# Patient Record
Sex: MALE | Race: WHITE | NOT HISPANIC OR LATINO | URBAN - METROPOLITAN AREA
[De-identification: names, ages, dates, MRNs, and addresses within clinical notes are randomized per-mention and may not be internally consistent; named-entity substitution may affect disease eponyms.]

---

## 2022-05-26 ENCOUNTER — APPOINTMENT (RX ONLY)
Dept: URBAN - METROPOLITAN AREA CLINIC 28 | Facility: CLINIC | Age: 27
Setting detail: DERMATOLOGY
End: 2022-05-26

## 2022-05-26 DIAGNOSIS — L70.0 ACNE VULGARIS: ICD-10-CM | Status: INADEQUATELY CONTROLLED

## 2022-05-26 PROCEDURE — ? PRESCRIPTION

## 2022-05-26 PROCEDURE — 99204 OFFICE O/P NEW MOD 45 MIN: CPT

## 2022-05-26 PROCEDURE — ? COUNSELING

## 2022-05-26 PROCEDURE — ? ADDITIONAL NOTES

## 2022-05-26 PROCEDURE — ? TREATMENT REGIMEN

## 2022-05-26 RX ORDER — ADAPALENE AND BENZOYL PEROXIDE 3; 25 MG/G; MG/G
GEL TOPICAL
Qty: 60 | Refills: 1 | Status: ERX | COMMUNITY
Start: 2022-05-26

## 2022-05-26 RX ORDER — CLINDAMYCIN PHOSPHATE 10 MG/ML
LOTION TOPICAL
Qty: 60 | Refills: 3 | Status: ERX | COMMUNITY
Start: 2022-05-26

## 2022-05-26 RX ADMIN — ADAPALENE AND BENZOYL PEROXIDE: 3; 25 GEL TOPICAL at 00:00

## 2022-05-26 RX ADMIN — CLINDAMYCIN PHOSPHATE: 10 LOTION TOPICAL at 00:00

## 2022-05-26 ASSESSMENT — LOCATION ZONE DERM: LOCATION ZONE: FACE

## 2022-05-26 ASSESSMENT — LOCATION SIMPLE DESCRIPTION DERM: LOCATION SIMPLE: LEFT CHEEK

## 2022-05-26 ASSESSMENT — LOCATION DETAILED DESCRIPTION DERM: LOCATION DETAILED: LEFT INFERIOR CENTRAL MALAR CHEEK

## 2022-05-26 NOTE — PROCEDURE: COUNSELING
High Dose Vitamin A Pregnancy And Lactation Text: High dose vitamin A therapy is contraindicated during pregnancy and breast feeding.
Spironolactone Counseling: Patient advised regarding risks of diarrhea, abdominal pain, hyperkalemia, birth defects (for female patients), liver toxicity and renal toxicity. The patient may need blood work to monitor liver and kidney function and potassium levels while on therapy. The patient verbalized understanding of the proper use and possible adverse effects of spironolactone.  All of the patient's questions and concerns were addressed.
Winlevi Counseling:  I discussed with the patient the risks of topical clascoterone including but not limited to erythema, scaling, itching, and stinging. Patient voiced their understanding.
Erythromycin Pregnancy And Lactation Text: This medication is Pregnancy Category B and is considered safe during pregnancy. It is also excreted in breast milk.
Benzoyl Peroxide Pregnancy And Lactation Text: This medication is Pregnancy Category C. It is unknown if benzoyl peroxide is excreted in breast milk.
Topical Clindamycin Counseling: Patient counseled that this medication may cause skin irritation or allergic reactions.  In the event of skin irritation, the patient was advised to reduce the amount of the drug applied or use it less frequently.   The patient verbalized understanding of the proper use and possible adverse effects of clindamycin.  All of the patient's questions and concerns were addressed.
Include Pregnancy/Lactation Warning?: No
Aklief Pregnancy And Lactation Text: It is unknown if this medication is safe to use during pregnancy.  It is unknown if this medication is excreted in breast milk.  Breastfeeding women should use the topical cream on the smallest area of the skin for the shortest time needed while breastfeeding.  Do not apply to nipple and areola.
Bactrim Counseling:  I discussed with the patient the risks of sulfa antibiotics including but not limited to GI upset, allergic reaction, drug rash, diarrhea, dizziness, photosensitivity, and yeast infections.  Rarely, more serious reactions can occur including but not limited to aplastic anemia, agranulocytosis, methemoglobinemia, blood dyscrasias, liver or kidney failure, lung infiltrates or desquamative/blistering drug rashes.
Minocycline Counseling: Patient advised regarding possible photosensitivity and discoloration of the teeth, skin, lips, tongue and gums.  Patient instructed to avoid sunlight, if possible.  When exposed to sunlight, patients should wear protective clothing, sunglasses, and sunscreen.  The patient was instructed to call the office immediately if the following severe adverse effects occur:  hearing changes, easy bruising/bleeding, severe headache, or vision changes.  The patient verbalized understanding of the proper use and possible adverse effects of minocycline.  All of the patient's questions and concerns were addressed.
Isotretinoin Counseling: Patient should get monthly blood tests, not donate blood, not drive at night if vision affected, not share medication, and not undergo elective surgery for 6 months after tx completed. Side effects reviewed, pt to contact office should one occur.
Winlevi Pregnancy And Lactation Text: This medication is considered safe during pregnancy and breastfeeding.
Dapsone Pregnancy And Lactation Text: This medication is Pregnancy Category C and is not considered safe during pregnancy or breast feeding.
Topical Retinoid counseling:  Patient advised to apply a pea-sized amount only at bedtime and wait 30 minutes after washing their face before applying.  If too drying, patient may add a non-comedogenic moisturizer. The patient verbalized understanding of the proper use and possible adverse effects of retinoids.  All of the patient's questions and concerns were addressed.
Bactrim Pregnancy And Lactation Text: This medication is Pregnancy Category D and is known to cause fetal risk.  It is also excreted in breast milk.
Spironolactone Pregnancy And Lactation Text: This medication can cause feminization of the male fetus and should be avoided during pregnancy. The active metabolite is also found in breast milk.
Azelaic Acid Counseling: Patient counseled that medicine may cause skin irritation and to avoid applying near the eyes.  In the event of skin irritation, the patient was advised to reduce the amount of the drug applied or use it less frequently.   The patient verbalized understanding of the proper use and possible adverse effects of azelaic acid.  All of the patient's questions and concerns were addressed.
Minocycline Pregnancy And Lactation Text: This medication is Pregnancy Category D and not consider safe during pregnancy. It is also excreted in breast milk.
Topical Clindamycin Pregnancy And Lactation Text: This medication is Pregnancy Category B and is considered safe during pregnancy. It is unknown if it is excreted in breast milk.
Doxycycline Counseling:  Patient counseled regarding possible photosensitivity and increased risk for sunburn.  Patient instructed to avoid sunlight, if possible.  When exposed to sunlight, patients should wear protective clothing, sunglasses, and sunscreen.  The patient was instructed to call the office immediately if the following severe adverse effects occur:  hearing changes, easy bruising/bleeding, severe headache, or vision changes.  The patient verbalized understanding of the proper use and possible adverse effects of doxycycline.  All of the patient's questions and concerns were addressed.
Birth Control Pills Counseling: Birth Control Pill Counseling: I discussed with the patient the potential side effects of OCPs including but not limited to increased risk of stroke, heart attack, thrombophlebitis, deep venous thrombosis, hepatic adenomas, breast changes, GI upset, headaches, and depression.  The patient verbalized understanding of the proper use and possible adverse effects of OCPs. All of the patient's questions and concerns were addressed.
Topical Retinoid Pregnancy And Lactation Text: This medication is Pregnancy Category C. It is unknown if this medication is excreted in breast milk.
Isotretinoin Pregnancy And Lactation Text: This medication is Pregnancy Category X and is considered extremely dangerous during pregnancy. It is unknown if it is excreted in breast milk.
Azithromycin Counseling:  I discussed with the patient the risks of azithromycin including but not limited to GI upset, allergic reaction, drug rash, diarrhea, and yeast infections.
Tetracycline Counseling: Patient counseled regarding possible photosensitivity and increased risk for sunburn.  Patient instructed to avoid sunlight, if possible.  When exposed to sunlight, patients should wear protective clothing, sunglasses, and sunscreen.  The patient was instructed to call the office immediately if the following severe adverse effects occur:  hearing changes, easy bruising/bleeding, severe headache, or vision changes.  The patient verbalized understanding of the proper use and possible adverse effects of tetracycline.  All of the patient's questions and concerns were addressed. Patient understands to avoid pregnancy while on therapy due to potential birth defects.
Azelaic Acid Pregnancy And Lactation Text: This medication is considered safe during pregnancy and breast feeding.
Sarecycline Counseling: Patient advised regarding possible photosensitivity and discoloration of the teeth, skin, lips, tongue and gums.  Patient instructed to avoid sunlight, if possible.  When exposed to sunlight, patients should wear protective clothing, sunglasses, and sunscreen.  The patient was instructed to call the office immediately if the following severe adverse effects occur:  hearing changes, easy bruising/bleeding, severe headache, or vision changes.  The patient verbalized understanding of the proper use and possible adverse effects of sarecycline.  All of the patient's questions and concerns were addressed.
Doxycycline Pregnancy And Lactation Text: This medication is Pregnancy Category D and not consider safe during pregnancy. It is also excreted in breast milk but is considered safe for shorter treatment courses.
Topical Sulfur Applications Counseling: Topical Sulfur Counseling: Patient counseled that this medication may cause skin irritation or allergic reactions.  In the event of skin irritation, the patient was advised to reduce the amount of the drug applied or use it less frequently.   The patient verbalized understanding of the proper use and possible adverse effects of topical sulfur application.  All of the patient's questions and concerns were addressed.
High Dose Vitamin A Counseling: Side effects reviewed, pt to contact office should one occur.
Tazorac Counseling:  Patient advised that medication is irritating and drying.  Patient may need to apply sparingly and wash off after an hour before eventually leaving it on overnight.  The patient verbalized understanding of the proper use and possible adverse effects of tazorac.  All of the patient's questions and concerns were addressed.
Birth Control Pills Pregnancy And Lactation Text: This medication should be avoided if pregnant and for the first 30 days post-partum.
Azithromycin Pregnancy And Lactation Text: This medication is considered safe during pregnancy and is also secreted in breast milk.
Topical Sulfur Applications Pregnancy And Lactation Text: This medication is Pregnancy Category C and has an unknown safety profile during pregnancy. It is unknown if this topical medication is excreted in breast milk.
Benzoyl Peroxide Counseling: Patient counseled that medicine may cause skin irritation and bleach clothing.  In the event of skin irritation, the patient was advised to reduce the amount of the drug applied or use it less frequently.   The patient verbalized understanding of the proper use and possible adverse effects of benzoyl peroxide.  All of the patient's questions and concerns were addressed.
Detail Level: Zone
Erythromycin Counseling:  I discussed with the patient the risks of erythromycin including but not limited to GI upset, allergic reaction, drug rash, diarrhea, increase in liver enzymes, and yeast infections.
Tazorac Pregnancy And Lactation Text: This medication is not safe during pregnancy. It is unknown if this medication is excreted in breast milk.
Aklief counseling:  Patient advised to apply a pea-sized amount only at bedtime and wait 30 minutes after washing their face before applying.  If too drying, patient may add a non-comedogenic moisturizer.  The most commonly reported side effects including irritation, redness, scaling, dryness, stinging, burning, itching, and increased risk of sunburn.  The patient verbalized understanding of the proper use and possible adverse effects of retinoids.  All of the patient's questions and concerns were addressed.
Dapsone Counseling: I discussed with the patient the risks of dapsone including but not limited to hemolytic anemia, agranulocytosis, rashes, methemoglobinemia, kidney failure, peripheral neuropathy, headaches, GI upset, and liver toxicity.  Patients who start dapsone require monitoring including baseline LFTs and weekly CBCs for the first month, then every month thereafter.  The patient verbalized understanding of the proper use and possible adverse effects of dapsone.  All of the patient's questions and concerns were addressed.

## 2022-07-12 ENCOUNTER — RX ONLY (OUTPATIENT)
Age: 27
Setting detail: RX ONLY
End: 2022-07-12

## 2022-07-12 ENCOUNTER — APPOINTMENT (RX ONLY)
Dept: URBAN - METROPOLITAN AREA CLINIC 28 | Facility: CLINIC | Age: 27
Setting detail: DERMATOLOGY
End: 2022-07-12

## 2022-07-12 DIAGNOSIS — L70.0 ACNE VULGARIS: ICD-10-CM | Status: WELL CONTROLLED

## 2022-07-12 PROCEDURE — ? TREATMENT REGIMEN

## 2022-07-12 PROCEDURE — ? PRESCRIPTION MEDICATION MANAGEMENT

## 2022-07-12 PROCEDURE — 99213 OFFICE O/P EST LOW 20 MIN: CPT

## 2022-07-12 PROCEDURE — ? RECOMMENDATIONS

## 2022-07-12 RX ORDER — ADAPALENE AND BENZOYL PEROXIDE 3; 25 MG/G; MG/G
1 GEL TOPICAL
Qty: 60 | Refills: 6 | Status: ERX

## 2022-07-12 RX ORDER — CLINDAMYCIN PHOSPHATE 10 MG/ML
1 LOTION TOPICAL
Qty: 60 | Refills: 6 | Status: ERX

## 2022-07-12 ASSESSMENT — LOCATION DETAILED DESCRIPTION DERM: LOCATION DETAILED: RIGHT INFERIOR CENTRAL MALAR CHEEK

## 2022-07-12 ASSESSMENT — LOCATION ZONE DERM: LOCATION ZONE: FACE

## 2022-07-12 ASSESSMENT — LOCATION SIMPLE DESCRIPTION DERM: LOCATION SIMPLE: RIGHT CHEEK

## 2022-07-12 NOTE — PROCEDURE: PRESCRIPTION MEDICATION MANAGEMENT
Render In Strict Bullet Format?: No
Detail Level: Zone
Continue Regimen: Epiduo forte 0.3-2.5% gel: apply thin layer to face QHS\\nClindamycin 1% lotion: apply thin layer to face QAM

## 2022-07-12 NOTE — PROCEDURE: RECOMMENDATIONS
Render Risk Assessment In Note?: no
Detail Level: Zone
Recommendation Preamble: The following recommendations were made during the visit:
Recommendations (Free Text): Micro needling for scars

## 2023-10-16 ENCOUNTER — OFFICE VISIT (OUTPATIENT)
Dept: PRIMARY CARE | Facility: CLINIC | Age: 28
End: 2023-10-16
Payer: COMMERCIAL

## 2023-10-16 VITALS
TEMPERATURE: 97.5 F | DIASTOLIC BLOOD PRESSURE: 80 MMHG | RESPIRATION RATE: 15 BRPM | OXYGEN SATURATION: 99 % | HEART RATE: 60 BPM | HEIGHT: 70 IN | BODY MASS INDEX: 25.62 KG/M2 | SYSTOLIC BLOOD PRESSURE: 118 MMHG | WEIGHT: 179 LBS

## 2023-10-16 DIAGNOSIS — Z00.00 ROUTINE GENERAL MEDICAL EXAMINATION AT A HEALTH CARE FACILITY: Primary | ICD-10-CM

## 2023-10-16 DIAGNOSIS — Z13.220 LIPID SCREENING: ICD-10-CM

## 2023-10-16 PROCEDURE — 99385 PREV VISIT NEW AGE 18-39: CPT | Performed by: STUDENT IN AN ORGANIZED HEALTH CARE EDUCATION/TRAINING PROGRAM

## 2023-10-16 PROCEDURE — 3008F BODY MASS INDEX DOCD: CPT | Performed by: STUDENT IN AN ORGANIZED HEALTH CARE EDUCATION/TRAINING PROGRAM

## 2023-10-16 ASSESSMENT — PATIENT HEALTH QUESTIONNAIRE - PHQ9: SUM OF ALL RESPONSES TO PHQ9 QUESTIONS 1 & 2: 0

## 2023-10-16 ASSESSMENT — PAIN SCALES - GENERAL: PAINLEVEL: 0-NO PAIN

## 2023-10-16 NOTE — PATIENT INSTRUCTIONS
After-Visit Instructions and Tips for General Wellbeing      -Let us know over our Patient Portal (Spring[facility]) if you have any further questions or concerns.         At your visit today, we determined the time for you to come back for your next visit.  This was based off of your medical history, any medical problems/symptoms discussed at time of visit, and any testing ordered.         If at any point between now and your next visit you to have any questions or concerns, please feel free to reach out to me via our Patient Portal, Spring[facility].  Springbot is free to use and I typically get back to you same-day, Monday through Friday.  Sometimes we are able to handle small questions or concerns over [employer], however if we cannot safely do so I will recommend that you come in for an appointment or get set up with a telemedicine appointment.      -Don't forget to get your blood work done.         Blood work is typically performed either through [facility], [facility], or [facility] laboratories.  This is dependent on your insurance, and during your visit we have discussed which lab is covered for you.  It will additionally state what lab you can go to on the hard copy of the lab slip that was provided to you after your visit.            Unless stated otherwise, these labs are FASTING.  You should not have anything to eat or drink besides water or black coffee (no cream or sugar) for at least 8 hours prior to getting your blood drawn.  Because of this, I usually recommend getting your blood work done first thing in the morning, as you have been fasting overnight.  Many people have the same idea however and these labs can get quite busy in the morning.         I recommend going online and setting up an appointment with the lab for an early morning just so that specific time spot is guaranteed for you and you are not waiting for an excess amount of time.  Make sure to bring the hard copy of your lab slip with you just in case they  cannot find you in their computer system.      As soon as I get all of your blood work results back, I will message you over [employer] or call you if the lab results are more pressing.      -Don't forget to get your Preventive Care screenings done.         If you were given any prescriptions for preventive care testing (such as mammograms, colonoscopies, Pap smears, or bone density testing), do not forget to get these done as well.  At your visit we discussed how to go about scheduling these, however if you still have questions you can reach out to me via [employer].         Additionally, all patients should be seen by their dentist for a routine dental cleaning at least once every 6 months.  Sometimes, dentists will bring you back sooner if they have to do more intensive cleaning.  Outside of the dentist, you should make sure you are brushing your teeth and flossing regularly.      -Dietary Tips         Every patient has individual dietary needs, however in general I recommend being cognizant of and limiting your intake of the following for food groups:    Salt/Sodium:       Salt can raise your blood pressure, which in turn can lead to increased risk of developing heart disease or strokes.  Please make sure you limit your use of sodium in cooking, and if you are buying a premade food item that has nutrition facts on the box/bag, please check for sodium content.  Many snacks such as chips or pretzels have a large amount of sodium in them.  Please do not exceed the daily recommended value of your sodium intake.    Sugars and Carbs:       Sugars (such as found in cake, candy, cookies, ice cream, etc.) and carbohydrates (such as found in bread, rice, pasta, starchy veggies such as potatoes, etc.) are both broken down your body into glucose.  Glucose is blood sugar, and excessive blood sugar exposure over the course of years to decades can put you at risk for developing diabetes.  Please make sure that you are limiting both  of these food groups in your diet, especially if you have a history of high blood sugar or strong family history of diabetes.    Fats:       Fatty foods such as fried/greasy foods, excess cooking oils, full fat dairy (whole milk, butter, cheese), and red meat all tend to be very fatty foods.  Fatty foods contain cholesterol, which can lead to heart disease in excessive amounts.  Please make sure you are limiting these foods in your diet.         Remember: The biggest thing on your plate should be responsibly cooked (sautéed in limited amounts of oil, air fried, or baked), responsibly seasoned (limited salt use), green vegetables (not potatoes).      -Exercise Tips         People exercise for multiple different reasons as each patient's exercise needs are unique.  In general, I recommend following the American Heart Association guidelines for heart healthy exercise.        The American Heart Association recommends doing 150 minutes (2.5 hours) of aerobic exercise per week.  You can break this up over the week however you see fit.  This exercise should be more strenuous than something you do on a daily basis, as such a light walk or counting your regular job as exercise do not count.  While these may be good from a calorie burning perspective, these are something your heart does on a daily basis and as such does not count as heart specific exercise.         Heart healthy exercise does not need to be particularly intensive.  Typically, a brisk walk up a hill (or on a treadmill that can incline) is more than enough.  The exercise should not be so intense that she could not focus on a podcast, TV show, or conversation during it.         If you have access to heart rate sensor such as through a smart watch, fitness tracker, or gym equipment that has this capability, a heart rate of about 140 is where most of my patients feel that they are able to maintain this exercise but do still derive cardiac benefit.      -Safety  Tips         Please make sure that you have both smoke detectors as well as carbon monoxide detectors in your home.  Additionally, please make sure that the batteries in these are up-to-date.  I recommend replacing batteries in both of these detectors every 6 months to ensure that the batteries do not run out and the smoke detectors/carbon monoxide detectors remain functional.         Additionally, please wear your seatbelt at all times when either operating a motor vehicle or as a passenger.  Regardless of location in the car you are seated, presence of airbags, or skill of the  of the vehicle, seatbelts are both required by law and greatly improve your chances of walking away from a car accident unscathed and should be worn at all times while the car is in motion.

## 2023-10-16 NOTE — PROGRESS NOTES
"     [facility]     CHIEF COMPLAINT   New Patient  Previous PCP: Dr. Choi, Pediatrics, [place], [place]     HISTORY OF PRESENT ILLNESS      This is a 27 y.o. male who presents to [facility] care.  Denies complaints today.    PAST MEDICAL AND SURGICAL HISTORY      History reviewed. No pertinent past medical history.    Past Surgical History:   Procedure Laterality Date    TONSILLECTOMY         MEDICATIONS      No current outpatient medications on file.    ALLERGIES      Patient has no known allergies.    FAMILY HISTORY      Family History   Problem Relation Age of Onset    No Known Problems Biological Mother     No Known Problems Biological Father     Melanoma Paternal Grandfather        SOCIAL HISTORY      Social History     Socioeconomic History    Marital status: Single     Spouse name: None    Number of children: None    Years of education: None    Highest education level: None   Occupational History    Occupation:    Tobacco Use    Smoking status: Never     Passive exposure: Never    Smokeless tobacco: Never   Vaping Use    Vaping Use: Never used   Substance and Sexual Activity    Alcohol use: Yes     Comment: Social    Drug use: Not Currently     Types: Benzodiazepines, Marijuana, MDMA (ecstacy), LSD    Sexual activity: Yes     Partners: Female       REVIEW OF SYSTEMS      Review of Systems   All other systems reviewed and are negative.      PHYSICAL EXAMINATION      Visit Vitals  /80   Pulse 60   Temp 36.4 °C (97.5 °F) (Temporal)   Resp 15   Ht 1.778 m (5' 10\")   Wt 81.2 kg (179 lb) Comment: with shoes on   SpO2 99%   BMI 25.68 kg/m²     Body mass index is 25.68 kg/m².    Physical Exam  Constitutional:       General: He is not in acute distress.     Appearance: He is well-developed. He is not toxic-appearing or diaphoretic.   HENT:      Head: Normocephalic and atraumatic.   Eyes:      Extraocular Movements: Extraocular movements intact.      " "Pupils: Pupils are equal, round, and reactive to light.   Cardiovascular:      Rate and Rhythm: Normal rate and regular rhythm.      Heart sounds: Normal heart sounds. No murmur heard.     No friction rub. No gallop.   Pulmonary:      Effort: Pulmonary effort is normal. No respiratory distress.      Breath sounds: Normal breath sounds. No wheezing or rales.   Musculoskeletal:         General: No deformity. Normal range of motion.      Cervical back: Normal range of motion and neck supple.      Right lower leg: No edema.      Left lower leg: No edema.   Skin:     General: Skin is warm and dry.      Coloration: Skin is not jaundiced.      Findings: No rash.   Neurological:      General: No focal deficit present.      Mental Status: He is alert and oriented to person, place, and time.   Psychiatric:         Mood and Affect: Mood normal.         Behavior: Behavior normal.         LABS / IMAGING / STUDIES        Labs    No results found for: \"CREATININE\"  No results found for: \"WBC\", \"HGB\", \"HCT\", \"MCV\", \"PLT\"      No results found for: \"HGBA1C\"  No results found for: \"MICROALBUR\", \"[id number]\"        HEALTH MAINTENANCE           Tdap: Unsure, but believes UTD  Flu: Counseled  COVID-19: Counseled    Labs: Due, ordered    Dentist: UTD    Diet: Generally well  Exercise: Limited       ASSESSMENT AND PLAN   Problem List Items Addressed This Visit        Other    Routine general medical examination at a health care facility - Primary     Patient is up-to-date with age-appropriate cancer screenings and vaccinations unless otherwise noted above.  Counseled regarding importance of keeping up-to-date with vaccinations and cancer screenings as applicable.  Baseline/repeat/annual labs ordered.  RTO 1 year or sooner PRN.           Relevant Orders    CBC and differential    Comprehensive metabolic panel    Lipid panel   Other Visit Diagnoses     Lipid screening        Relevant Orders    Lipid panel          Health Care Maintenance " (discussed if applicable):    Patient encouraged to increase activity gradually as tolerated with a goal of 150 minutes of aerobic activity per week via AVS.     Counseled patient on healthy eating (high quality, low carb, low sugar diet) via AVS.     Advised patient to complete regular dental examinations, brushing and flossing daily via AVS.    Encouraged enhanced safety by wearing seat belts, checking smoke and CO detectors via AVS.            Frank Meyer, DO  10/16/23        Some or all of this note has been written using voice recognition software.    Please excuse any typographical errors.    On date of encounter, 30 minutes were spent exclusively dedicated to this encounter including pre-visit chart review and documentation, patient interview and physical examination, and post-visit documentation.

## 2023-12-13 LAB
ALBUMIN SERPL-MCNC: 4.6 G/DL (ref 3.6–5.1)
ALBUMIN/GLOB SERPL: 1.8 (CALC) (ref 1–2.5)
ALP SERPL-CCNC: 53 U/L (ref 36–130)
ALT SERPL-CCNC: 26 U/L (ref 9–46)
AST SERPL-CCNC: 18 U/L (ref 10–40)
BASOPHILS # BLD AUTO: 39 CELLS/UL (ref 0–200)
BASOPHILS NFR BLD AUTO: 0.7 %
BILIRUB SERPL-MCNC: 1.2 MG/DL (ref 0.2–1.2)
BUN SERPL-MCNC: 14 MG/DL (ref 7–25)
BUN/CREAT SERPL: NORMAL (CALC) (ref 6–22)
CALCIUM SERPL-MCNC: 9.2 MG/DL (ref 8.6–10.3)
CHLORIDE SERPL-SCNC: 104 MMOL/L (ref 98–110)
CHOLEST SERPL-MCNC: 182 MG/DL
CHOLEST/HDLC SERPL: 3.2 (CALC)
CO2 SERPL-SCNC: 29 MMOL/L (ref 20–32)
CREAT SERPL-MCNC: 0.88 MG/DL (ref 0.6–1.24)
EGFRCR SERPLBLD CKD-EPI 2021: 121 ML/MIN/1.73M2
EOSINOPHIL # BLD AUTO: 190 CELLS/UL (ref 15–500)
EOSINOPHIL NFR BLD AUTO: 3.4 %
ERYTHROCYTE [DISTWIDTH] IN BLOOD BY AUTOMATED COUNT: 12.7 % (ref 11–15)
GLOBULIN SER CALC-MCNC: 2.5 G/DL (CALC) (ref 1.9–3.7)
GLUCOSE SERPL-MCNC: 92 MG/DL (ref 65–99)
HCT VFR BLD AUTO: 45.1 % (ref 38.5–50)
HDLC SERPL-MCNC: 57 MG/DL
HGB BLD-MCNC: 15.2 G/DL (ref 13.2–17.1)
LDLC SERPL CALC-MCNC: 109 MG/DL (CALC)
LYMPHOCYTES # BLD AUTO: 2027 CELLS/UL (ref 850–3900)
LYMPHOCYTES NFR BLD AUTO: 36.2 %
MCH RBC QN AUTO: 29.5 PG (ref 27–33)
MCHC RBC AUTO-ENTMCNC: 33.7 G/DL (ref 32–36)
MCV RBC AUTO: 87.4 FL (ref 80–100)
MONOCYTES # BLD AUTO: 498 CELLS/UL (ref 200–950)
MONOCYTES NFR BLD AUTO: 8.9 %
NEUTROPHILS # BLD AUTO: 2845 CELLS/UL (ref 1500–7800)
NEUTROPHILS NFR BLD AUTO: 50.8 %
NONHDLC SERPL-MCNC: 125 MG/DL (CALC)
PLATELET # BLD AUTO: 212 THOUSAND/UL (ref 140–400)
PMV BLD REES-ECKER: 9.6 FL (ref 7.5–12.5)
POTASSIUM SERPL-SCNC: 4 MMOL/L (ref 3.5–5.3)
PROT SERPL-MCNC: 7.1 G/DL (ref 6.1–8.1)
RBC # BLD AUTO: 5.16 MILLION/UL (ref 4.2–5.8)
SODIUM SERPL-SCNC: 139 MMOL/L (ref 135–146)
TRIGL SERPL-MCNC: 70 MG/DL
WBC # BLD AUTO: 5.6 THOUSAND/UL (ref 3.8–10.8)

## 2024-01-23 ENCOUNTER — APPOINTMENT (RX ONLY)
Dept: URBAN - METROPOLITAN AREA CLINIC 28 | Facility: CLINIC | Age: 29
Setting detail: DERMATOLOGY
End: 2024-01-23

## 2024-01-23 DIAGNOSIS — Z12.83 ENCOUNTER FOR SCREENING FOR MALIGNANT NEOPLASM OF SKIN: ICD-10-CM

## 2024-01-23 DIAGNOSIS — L57.8 OTHER SKIN CHANGES DUE TO CHRONIC EXPOSURE TO NONIONIZING RADIATION: ICD-10-CM

## 2024-01-23 DIAGNOSIS — L21.8 OTHER SEBORRHEIC DERMATITIS: ICD-10-CM | Status: INADEQUATELY CONTROLLED

## 2024-01-23 DIAGNOSIS — L81.4 OTHER MELANIN HYPERPIGMENTATION: ICD-10-CM

## 2024-01-23 DIAGNOSIS — Z71.89 OTHER SPECIFIED COUNSELING: ICD-10-CM

## 2024-01-23 DIAGNOSIS — D22 MELANOCYTIC NEVI: ICD-10-CM

## 2024-01-23 PROBLEM — D22.5 MELANOCYTIC NEVI OF TRUNK: Status: ACTIVE | Noted: 2024-01-23

## 2024-01-23 PROCEDURE — ? PRESCRIPTION MEDICATION MANAGEMENT

## 2024-01-23 PROCEDURE — ? COUNSELING

## 2024-01-23 PROCEDURE — 99214 OFFICE O/P EST MOD 30 MIN: CPT

## 2024-01-23 ASSESSMENT — LOCATION DETAILED DESCRIPTION DERM
LOCATION DETAILED: LEFT ANTERIOR SHOULDER
LOCATION DETAILED: RIGHT MEDIAL MALAR CHEEK
LOCATION DETAILED: RIGHT CLAVICULAR NECK
LOCATION DETAILED: STERNAL NOTCH
LOCATION DETAILED: LEFT CLAVICULAR NECK
LOCATION DETAILED: RIGHT MEDIAL SUPERIOR CHEST
LOCATION DETAILED: RIGHT ANTERIOR SHOULDER
LOCATION DETAILED: LEFT NASAL ALA

## 2024-01-23 ASSESSMENT — LOCATION ZONE DERM
LOCATION ZONE: FACE
LOCATION ZONE: TRUNK
LOCATION ZONE: NOSE
LOCATION ZONE: NECK
LOCATION ZONE: ARM

## 2024-01-23 ASSESSMENT — LOCATION SIMPLE DESCRIPTION DERM
LOCATION SIMPLE: RIGHT ANTERIOR NECK
LOCATION SIMPLE: CHEST
LOCATION SIMPLE: LEFT ANTERIOR NECK
LOCATION SIMPLE: RIGHT CHEEK
LOCATION SIMPLE: RIGHT SHOULDER
LOCATION SIMPLE: LEFT SHOULDER
LOCATION SIMPLE: LEFT NOSE

## 2024-01-23 NOTE — HPI: EVALUATION OF SKIN LESION(S)
What Type Of Note Output Would You Prefer (Optional)?: Bullet Format
Hpi Title: Evaluation of Skin Lesions
How Severe Are Your Spot(S)?: moderate
Family Member: Paternal grandfather
Additional History: Patient states his girlfriend noticed a spot on left side of his back.

## 2024-01-23 NOTE — PROCEDURE: PRESCRIPTION MEDICATION MANAGEMENT
Detail Level: Simple
Render In Strict Bullet Format?: No
Samples Given: La rosche posay moisturizer \\nCeraVe moisturizer
Plan: patient offered topical treatment but declined wanting to use it

## 2024-10-16 ENCOUNTER — OFFICE VISIT (OUTPATIENT)
Dept: PRIMARY CARE | Facility: CLINIC | Age: 29
End: 2024-10-16
Payer: COMMERCIAL

## 2024-10-16 VITALS
OXYGEN SATURATION: 98 % | DIASTOLIC BLOOD PRESSURE: 82 MMHG | BODY MASS INDEX: 27.49 KG/M2 | RESPIRATION RATE: 15 BRPM | WEIGHT: 192 LBS | SYSTOLIC BLOOD PRESSURE: 122 MMHG | HEART RATE: 79 BPM | HEIGHT: 70 IN | TEMPERATURE: 98.3 F

## 2024-10-16 DIAGNOSIS — Z13.220 LIPID SCREENING: ICD-10-CM

## 2024-10-16 DIAGNOSIS — Z00.00 ROUTINE GENERAL MEDICAL EXAMINATION AT A HEALTH CARE FACILITY: Primary | ICD-10-CM

## 2024-10-16 PROCEDURE — 3008F BODY MASS INDEX DOCD: CPT | Performed by: STUDENT IN AN ORGANIZED HEALTH CARE EDUCATION/TRAINING PROGRAM

## 2024-10-16 PROCEDURE — 99395 PREV VISIT EST AGE 18-39: CPT | Performed by: STUDENT IN AN ORGANIZED HEALTH CARE EDUCATION/TRAINING PROGRAM

## 2024-10-16 ASSESSMENT — PATIENT HEALTH QUESTIONNAIRE - PHQ9: SUM OF ALL RESPONSES TO PHQ9 QUESTIONS 1 & 2: 0

## 2024-10-16 ASSESSMENT — PAIN SCALES - GENERAL: PAINLEVEL_OUTOF10: 0-NO PAIN

## 2024-10-16 NOTE — PATIENT INSTRUCTIONS
After-Visit Instructions and Tips for General Wellbeing      -Let us know over our Patient Portal ([facility]) if you have any further questions or concerns.         At your visit today, we determined the time for you to come back for your next visit.  This was based off of your medical history, any medical problems/symptoms discussed at time of visit, and any testing ordered.         If at any point between now and your next visit you have any questions or concerns, please feel free to reach out to me via our Patient Portal, [facility].  [employer] is free to use and I typically get back to you same-day, Monday through Friday.  Sometimes we are able to handle small questions or concerns over [employer], however if we cannot safely do so I will recommend that you come in for an appointment or get set up with a telemedicine appointment.      -Don't forget to get your blood work done.         Blood work is typically performed either through [facility], [email or web address], or [facility].  This is dependent on your insurance, and during your visit we have discussed which lab is covered for you.  It will additionally state what lab you can go to on the hard copy of the lab slip that was provided to you after your visit.            Unless stated otherwise, these labs are FASTING.  You should not have anything to eat or drink besides water or black coffee (no cream or sugar) for at least 8 hours prior to getting your blood drawn.  Because of this, I usually recommend getting your blood work done first thing in the morning, as you have been fasting overnight.  Many people have the same idea however and these labs can get quite busy in the morning.         I recommend going online and setting up an appointment with the lab for an early morning just so that specific time spot is guaranteed for you and you are not waiting for an excess amount of time.  Make sure to bring the hard copy of your lab slip with you just in case they cannot  find you in their computer system.      As soon as I get all of your blood work results back, I will message you over [facility] or call you if the lab results are more pressing.      -Don't forget to get your Preventive Care screenings done.         If you were given any prescriptions for preventive care testing (such as mammograms, colonoscopies, Pap smears, or bone density testing), do not forget to get these done as well.  At your visit we discussed how to go about scheduling these, however if you still have questions you can reach out to me via [facility].         Additionally, all patients should be seen by their dentist for a routine dental cleaning at least once every 6 months.  Sometimes, dentists will bring you back sooner if they have to do more intensive cleaning.  Outside of the dentist, you should make sure you are brushing your teeth and flossing regularly.      -Dietary Tips         Every patient has individual dietary needs, however in general I recommend being cognizant of and limiting your intake of the following for food groups:    Salt/Sodium:       Salt can raise your blood pressure, which in turn can lead to increased risk of developing heart disease or strokes.  Please make sure you limit your use of sodium in cooking, and if you are buying a premade food item that has nutrition facts on the box/bag, please check for sodium content.  Many snacks such as chips or pretzels have a large amount of sodium in them.  Please do not exceed the daily recommended value of your sodium intake.    Sugars and Carbs:       Sugars (such as found in cake, candy, cookies, ice cream, etc.) and carbohydrates (such as found in bread, rice, pasta, starchy veggies such as potatoes, etc.) are both broken down your body into glucose.  Glucose is blood sugar, and excessive blood sugar exposure over the course of years to decades can put you at risk for developing diabetes.  Please make sure that you are limiting both of  these food groups in your diet, especially if you have a history of high blood sugar or strong family history of diabetes.    Fats:       Fatty foods such as fried/greasy foods, excess cooking oils, full fat dairy (whole milk, butter, cheese), and red meat all tend to be very fatty foods.  Fatty foods contain cholesterol, which can lead to heart disease in excessive amounts.  Please make sure you are limiting these foods in your diet.         Remember: The biggest thing on your plate should be responsibly cooked (sautéed in limited amounts of oil, air fried, or baked), responsibly seasoned (limited salt use), green vegetables (not potatoes).      -Exercise Tips         People exercise for multiple different reasons as each patient's exercise needs are unique.  In general, I recommend following the American Heart Association guidelines for heart healthy exercise.        The American Heart Association recommends doing 150 minutes (2.5 hours) of aerobic exercise per week.  You can break this up over the week however you see fit.  This exercise should be more strenuous than something you do on a daily basis, as such a light walk or counting your regular job as exercise do not count.  While these may be good from a calorie burning perspective, these are something your heart does on a daily basis and as such does not count as heart specific exercise.         Heart healthy exercise does not need to be particularly intensive.  Typically, a brisk walk up a hill (or on a treadmill that can incline) is more than enough.  The exercise should not be so intense that she could not focus on a podcast, TV show, or conversation during it.         If you have access to heart rate sensor such as through a smart watch, fitness tracker, or gym equipment that has this capability, a heart rate of about 140 is where most of my patients feel that they are able to maintain this exercise but do still derive cardiac benefit.      -Safety  Tips         Please make sure that you have both smoke detectors as well as carbon monoxide detectors in your home.  Additionally, please make sure that the batteries in these are up-to-date.  I recommend replacing batteries in both of these detectors every 6 months to ensure that the batteries do not run out and the smoke detectors/carbon monoxide detectors remain functional.         Additionally, please wear your seatbelt at all times when either operating a motor vehicle or as a passenger.  Regardless of location in the car you are seated, presence of airbags, or skill of the  of the vehicle, seatbelts are both required by law and greatly improve your chances of walking away from a car accident unscathed and should be worn at all times while the car is in motion.

## 2024-10-16 NOTE — PROGRESS NOTES
"     [facility]     CHIEF COMPLAINT   Annual Physical Exam     HISTORY OF PRESENT ILLNESS      This is a 28 y.o. male who presents for an annual physical exam.  Denies acute concerns today.    PAST MEDICAL AND SURGICAL HISTORY      History reviewed. No pertinent past medical history.    Past Surgical History   Procedure Laterality Date    Tonsillectomy      [place] tooth extraction         MEDICATIONS      No current outpatient medications on file.    ALLERGIES      Patient has no known allergies.    FAMILY HISTORY      Family History   Problem Relation Name Age of Onset    No Known Problems Biological Mother      No Known Problems Biological Father      Melanoma Paternal Grandfather         SOCIAL HISTORY      Social History     Socioeconomic History    Marital status: Single     Spouse name: None    Number of children: None    Years of education: None    Highest education level: None   Occupational History    Occupation:    Tobacco Use    Smoking status: Never     Passive exposure: Never    Smokeless tobacco: Never   Vaping Use    Vaping status: Never Used   Substance and Sexual Activity    Alcohol use: Yes     Comment: Social    Drug use: Not Currently     Types: Benzodiazepines, Marijuana, MDMA (ecstacy), LSD    Sexual activity: Yes     Partners: Female       REVIEW OF SYSTEMS      Review of Systems   All other systems reviewed and are negative.      PHYSICAL EXAMINATION      Visit Vitals  /82 (BP Location: Right upper arm, Patient Position: Sitting)   Pulse 79   Temp 36.8 °C (98.3 °F) (Temporal)   Resp 15   Ht 1.778 m (5' 10\")   Wt 87.1 kg (192 lb) Comment: with shoes on   SpO2 98%   BMI 27.55 kg/m²     Body mass index is 27.55 kg/m².    Physical Exam  Vitals reviewed.   Constitutional:       General: He is not in acute distress.     Appearance: He is well-developed. He is not toxic-appearing or diaphoretic.   HENT:      Head: Normocephalic and atraumatic. " "  Eyes:      Extraocular Movements: Extraocular movements intact.      Pupils: Pupils are equal, round, and reactive to light.   Cardiovascular:      Rate and Rhythm: Normal rate and regular rhythm.      Heart sounds: Normal heart sounds. No murmur heard.     No friction rub. No gallop.   Pulmonary:      Effort: Pulmonary effort is normal. No respiratory distress.      Breath sounds: Normal breath sounds. No wheezing or rales.   Musculoskeletal:         General: No deformity. Normal range of motion.      Cervical back: Normal range of motion and neck supple.      Right lower leg: No edema.      Left lower leg: No edema.   Skin:     General: Skin is warm and dry.      Coloration: Skin is not jaundiced.      Findings: No rash.   Neurological:      General: No focal deficit present.      Mental Status: He is alert and oriented to person, place, and time.   Psychiatric:         Mood and Affect: Mood normal.         Behavior: Behavior normal.         LABS / IMAGING / STUDIES        Labs    Lab Results   Component Value Date    CREATININE 0.88 12/12/2023     Lab Results   Component Value Date    WBC 5.6 12/12/2023    HGB 15.2 12/12/2023    HCT 45.1 12/12/2023    MCV 87.4 12/12/2023     12/12/2023         No results found for: \"HGBA1C\"  No results found for: \"MICROALBUR\", \"[id number]\"        HEALTH MAINTENANCE         Dentist: [facility]     Tdap: Counseled  Flu: Deferred ISO rxn  Covid-19: Counseled     Diet: Balanced   Exercise: Peloton 2x/week + walking at work        ASSESSMENT AND PLAN   Problem List Items Addressed This Visit          Other    Routine general medical examination at a health care facility - Primary     Patient is up-to-date with age-appropriate cancer screenings and vaccinations unless otherwise noted above.  Counseled regarding importance of keeping up-to-date with vaccinations and cancer screenings as applicable.  Baseline/repeat/annual labs ordered.  RTO 1 year or sooner PRN.           Relevant " Orders    CBC and differential    Comprehensive metabolic panel    Lipid panel    Hemoglobin A1c    TSH w reflex FT4     Other Visit Diagnoses       Lipid screening        Relevant Orders    Lipid panel            Health Care Maintenance (discussed if applicable):    Patient encouraged to increase activity gradually as tolerated with a goal of 150 minutes of aerobic activity per week via AVS.     Counseled patient on healthy eating (high quality, low carb, low sugar diet) via AVS.     Advised patient to complete regular dental examinations, brushing and flossing daily via AVS.    Encouraged enhanced safety by wearing seat belts, checking smoke and CO detectors via AVS.            Frank Meyer, DO  10/16/24        Some or all of this note has been written using voice recognition software.    Please excuse any typographical errors.    On date of encounter, 30 minutes were spent exclusively dedicated to this encounter including pre-visit chart review and documentation, patient interview and physical examination, and post-visit documentation.

## 2024-10-16 NOTE — PROGRESS NOTES
Dentist: UTD    Tdap: Counseled  Flu: Deferred ISO rxn  Covid-19: Counseled    Diet: Balanced   Exercise: Peloton 2x/week

## 2025-01-31 LAB
ALBUMIN SERPL-MCNC: 4.7 G/DL (ref 3.6–5.1)
ALBUMIN/GLOB SERPL: 1.9 (CALC) (ref 1–2.5)
ALP SERPL-CCNC: 58 U/L (ref 36–130)
ALT SERPL-CCNC: 25 U/L (ref 9–46)
AST SERPL-CCNC: 23 U/L (ref 10–40)
BASOPHILS # BLD AUTO: 42 CELLS/UL (ref 0–200)
BASOPHILS NFR BLD AUTO: 0.7 %
BILIRUB SERPL-MCNC: 1.5 MG/DL (ref 0.2–1.2)
BUN SERPL-MCNC: 15 MG/DL (ref 7–25)
BUN/CREAT SERPL: ABNORMAL (CALC) (ref 6–22)
CALCIUM SERPL-MCNC: 9 MG/DL (ref 8.6–10.3)
CHLORIDE SERPL-SCNC: 104 MMOL/L (ref 98–110)
CHOLEST SERPL-MCNC: 186 MG/DL
CHOLEST/HDLC SERPL: 3.5 (CALC)
CO2 SERPL-SCNC: 27 MMOL/L (ref 20–32)
CREAT SERPL-MCNC: 0.84 MG/DL (ref 0.6–1.24)
EGFRCR SERPLBLD CKD-EPI 2021: 122 ML/MIN/1.73M2
EOSINOPHIL # BLD AUTO: 204 CELLS/UL (ref 15–500)
EOSINOPHIL NFR BLD AUTO: 3.4 %
ERYTHROCYTE [DISTWIDTH] IN BLOOD BY AUTOMATED COUNT: 12.2 % (ref 11–15)
GLOBULIN SER CALC-MCNC: 2.5 G/DL (CALC) (ref 1.9–3.7)
GLUCOSE SERPL-MCNC: 78 MG/DL (ref 65–99)
HBA1C MFR BLD: 5.3 % OF TOTAL HGB
HCT VFR BLD AUTO: 44.8 % (ref 38.5–50)
HDLC SERPL-MCNC: 53 MG/DL
HGB BLD-MCNC: 14.8 G/DL (ref 13.2–17.1)
LDLC SERPL CALC-MCNC: 117 MG/DL (CALC)
LYMPHOCYTES # BLD AUTO: 2178 CELLS/UL (ref 850–3900)
LYMPHOCYTES NFR BLD AUTO: 36.3 %
MCH RBC QN AUTO: 28.2 PG (ref 27–33)
MCHC RBC AUTO-ENTMCNC: 33 G/DL (ref 32–36)
MCV RBC AUTO: 85.3 FL (ref 80–100)
MONOCYTES # BLD AUTO: 552 CELLS/UL (ref 200–950)
MONOCYTES NFR BLD AUTO: 9.2 %
NEUTROPHILS # BLD AUTO: 3024 CELLS/UL (ref 1500–7800)
NEUTROPHILS NFR BLD AUTO: 50.4 %
NONHDLC SERPL-MCNC: 133 MG/DL (CALC)
PLATELET # BLD AUTO: 212 THOUSAND/UL (ref 140–400)
PMV BLD REES-ECKER: 9.3 FL (ref 7.5–12.5)
POTASSIUM SERPL-SCNC: 3.8 MMOL/L (ref 3.5–5.3)
PROT SERPL-MCNC: 7.2 G/DL (ref 6.1–8.1)
RBC # BLD AUTO: 5.25 MILLION/UL (ref 4.2–5.8)
SODIUM SERPL-SCNC: 140 MMOL/L (ref 135–146)
TRIGL SERPL-MCNC: 67 MG/DL
TSH SERPL-ACNC: 1.39 MIU/L (ref 0.4–4.5)
WBC # BLD AUTO: 6 THOUSAND/UL (ref 3.8–10.8)

## 2025-02-13 ENCOUNTER — APPOINTMENT (OUTPATIENT)
Dept: URBAN - METROPOLITAN AREA CLINIC 28 | Facility: CLINIC | Age: 30
Setting detail: DERMATOLOGY
End: 2025-02-13

## 2025-02-13 DIAGNOSIS — Z80.8 FAMILY HISTORY OF MALIGNANT NEOPLASM OF OTHER ORGANS OR SYSTEMS: ICD-10-CM

## 2025-02-13 DIAGNOSIS — L57.8 OTHER SKIN CHANGES DUE TO CHRONIC EXPOSURE TO NONIONIZING RADIATION: ICD-10-CM

## 2025-02-13 DIAGNOSIS — Z71.89 OTHER SPECIFIED COUNSELING: ICD-10-CM

## 2025-02-13 DIAGNOSIS — L81.4 OTHER MELANIN HYPERPIGMENTATION: ICD-10-CM

## 2025-02-13 DIAGNOSIS — D22 MELANOCYTIC NEVI: ICD-10-CM

## 2025-02-13 DIAGNOSIS — L82.1 OTHER SEBORRHEIC KERATOSIS: ICD-10-CM

## 2025-02-13 DIAGNOSIS — D18.0 HEMANGIOMA: ICD-10-CM

## 2025-02-13 DIAGNOSIS — Z12.83 ENCOUNTER FOR SCREENING FOR MALIGNANT NEOPLASM OF SKIN: ICD-10-CM

## 2025-02-13 PROBLEM — D18.01 HEMANGIOMA OF SKIN AND SUBCUTANEOUS TISSUE: Status: ACTIVE | Noted: 2025-02-13

## 2025-02-13 PROBLEM — D22.5 MELANOCYTIC NEVI OF TRUNK: Status: ACTIVE | Noted: 2025-02-13

## 2025-02-13 PROCEDURE — 99213 OFFICE O/P EST LOW 20 MIN: CPT

## 2025-02-13 PROCEDURE — ? COUNSELING

## 2025-02-13 ASSESSMENT — LOCATION ZONE DERM
LOCATION ZONE: TRUNK
LOCATION ZONE: NECK
LOCATION ZONE: ARM

## 2025-02-13 ASSESSMENT — LOCATION DETAILED DESCRIPTION DERM
LOCATION DETAILED: LEFT CLAVICULAR NECK
LOCATION DETAILED: RIGHT MEDIAL SUPERIOR CHEST
LOCATION DETAILED: LEFT ANTERIOR SHOULDER
LOCATION DETAILED: EPIGASTRIC SKIN
LOCATION DETAILED: STERNAL NOTCH
LOCATION DETAILED: RIGHT MEDIAL UPPER BACK
LOCATION DETAILED: RIGHT ANTERIOR SHOULDER
LOCATION DETAILED: RIGHT CLAVICULAR NECK

## 2025-02-13 ASSESSMENT — LOCATION SIMPLE DESCRIPTION DERM
LOCATION SIMPLE: LEFT ANTERIOR NECK
LOCATION SIMPLE: CHEST
LOCATION SIMPLE: LEFT SHOULDER
LOCATION SIMPLE: RIGHT ANTERIOR NECK
LOCATION SIMPLE: RIGHT UPPER BACK
LOCATION SIMPLE: RIGHT SHOULDER
LOCATION SIMPLE: ABDOMEN